# Patient Record
Sex: FEMALE | Race: OTHER | HISPANIC OR LATINO
[De-identification: names, ages, dates, MRNs, and addresses within clinical notes are randomized per-mention and may not be internally consistent; named-entity substitution may affect disease eponyms.]

---

## 2019-03-13 ENCOUNTER — APPOINTMENT (OUTPATIENT)
Dept: ANTEPARTUM | Facility: CLINIC | Age: 34
End: 2019-03-13

## 2019-03-28 ENCOUNTER — ASOB RESULT (OUTPATIENT)
Age: 34
End: 2019-03-28

## 2019-03-28 ENCOUNTER — APPOINTMENT (OUTPATIENT)
Dept: ANTEPARTUM | Facility: CLINIC | Age: 34
End: 2019-03-28
Payer: MEDICAID

## 2019-03-28 PROCEDURE — 76811 OB US DETAILED SNGL FETUS: CPT

## 2019-03-28 PROCEDURE — 76817 TRANSVAGINAL US OBSTETRIC: CPT

## 2019-05-21 ENCOUNTER — ASOB RESULT (OUTPATIENT)
Age: 34
End: 2019-05-21

## 2019-05-21 ENCOUNTER — APPOINTMENT (OUTPATIENT)
Dept: ANTEPARTUM | Facility: CLINIC | Age: 34
End: 2019-05-21
Payer: MEDICAID

## 2019-05-21 PROCEDURE — 76816 OB US FOLLOW-UP PER FETUS: CPT

## 2019-07-16 ENCOUNTER — APPOINTMENT (OUTPATIENT)
Dept: ANTEPARTUM | Facility: CLINIC | Age: 34
End: 2019-07-16
Payer: MEDICAID

## 2019-07-16 ENCOUNTER — ASOB RESULT (OUTPATIENT)
Age: 34
End: 2019-07-16

## 2019-07-16 PROCEDURE — 76819 FETAL BIOPHYS PROFIL W/O NST: CPT

## 2019-07-16 PROCEDURE — 76816 OB US FOLLOW-UP PER FETUS: CPT

## 2019-07-18 ENCOUNTER — OUTPATIENT (OUTPATIENT)
Dept: OUTPATIENT SERVICES | Facility: HOSPITAL | Age: 34
LOS: 1 days | End: 2019-07-18
Payer: COMMERCIAL

## 2019-07-18 DIAGNOSIS — Z01.818 ENCOUNTER FOR OTHER PREPROCEDURAL EXAMINATION: ICD-10-CM

## 2019-07-18 PROCEDURE — T1013: CPT

## 2019-07-25 ENCOUNTER — TRANSCRIPTION ENCOUNTER (OUTPATIENT)
Age: 34
End: 2019-07-25

## 2019-07-26 ENCOUNTER — INPATIENT (INPATIENT)
Facility: HOSPITAL | Age: 34
LOS: 2 days | Discharge: HOME | End: 2019-07-29
Attending: OBSTETRICS & GYNECOLOGY | Admitting: OBSTETRICS & GYNECOLOGY
Payer: COMMERCIAL

## 2019-07-26 ENCOUNTER — RESULT REVIEW (OUTPATIENT)
Age: 34
End: 2019-07-26

## 2019-07-26 ENCOUNTER — TRANSCRIPTION ENCOUNTER (OUTPATIENT)
Age: 34
End: 2019-07-26

## 2019-07-26 VITALS
SYSTOLIC BLOOD PRESSURE: 140 MMHG | TEMPERATURE: 98 F | DIASTOLIC BLOOD PRESSURE: 80 MMHG | HEART RATE: 76 BPM | RESPIRATION RATE: 16 BRPM

## 2019-07-26 DIAGNOSIS — O47.1 FALSE LABOR AT OR AFTER 37 COMPLETED WEEKS OF GESTATION: ICD-10-CM

## 2019-07-26 DIAGNOSIS — O34.219 MATERNAL CARE FOR UNSPECIFIED TYPE SCAR FROM PREVIOUS CESAREAN DELIVERY: ICD-10-CM

## 2019-07-26 DIAGNOSIS — O14.93 UNSPECIFIED PRE-ECLAMPSIA, THIRD TRIMESTER: ICD-10-CM

## 2019-07-26 LAB
ALBUMIN SERPL ELPH-MCNC: 3.7 G/DL — SIGNIFICANT CHANGE UP (ref 3.3–5.2)
ALP SERPL-CCNC: 194 U/L — HIGH (ref 40–120)
ALT FLD-CCNC: 8 U/L — SIGNIFICANT CHANGE UP
ANION GAP SERPL CALC-SCNC: 13 MMOL/L — SIGNIFICANT CHANGE UP (ref 5–17)
APPEARANCE UR: CLEAR — SIGNIFICANT CHANGE UP
APTT BLD: 28.1 SEC — SIGNIFICANT CHANGE UP (ref 27.5–36.3)
AST SERPL-CCNC: 23 U/L — SIGNIFICANT CHANGE UP
BACTERIA # UR AUTO: NEGATIVE — SIGNIFICANT CHANGE UP
BASOPHILS # BLD AUTO: 0.06 K/UL — SIGNIFICANT CHANGE UP (ref 0–0.2)
BASOPHILS NFR BLD AUTO: 0.7 % — SIGNIFICANT CHANGE UP (ref 0–2)
BILIRUB SERPL-MCNC: 0.3 MG/DL — LOW (ref 0.4–2)
BILIRUB UR-MCNC: NEGATIVE — SIGNIFICANT CHANGE UP
BLD GP AB SCN SERPL QL: SIGNIFICANT CHANGE UP
BUN SERPL-MCNC: 9 MG/DL — SIGNIFICANT CHANGE UP (ref 8–20)
CALCIUM SERPL-MCNC: 9.4 MG/DL — SIGNIFICANT CHANGE UP (ref 8.6–10.2)
CHLORIDE SERPL-SCNC: 108 MMOL/L — HIGH (ref 98–107)
CO2 SERPL-SCNC: 19 MMOL/L — LOW (ref 22–29)
COLOR SPEC: YELLOW — SIGNIFICANT CHANGE UP
CREAT ?TM UR-MCNC: 34 MG/DL — SIGNIFICANT CHANGE UP
CREAT SERPL-MCNC: 0.4 MG/DL — LOW (ref 0.5–1.3)
DIFF PNL FLD: ABNORMAL
EOSINOPHIL # BLD AUTO: 0.09 K/UL — SIGNIFICANT CHANGE UP (ref 0–0.5)
EOSINOPHIL NFR BLD AUTO: 1.1 % — SIGNIFICANT CHANGE UP (ref 0–6)
EPI CELLS # UR: SIGNIFICANT CHANGE UP
FIBRINOGEN PPP-MCNC: 662 MG/DL — HIGH (ref 350–510)
GLUCOSE SERPL-MCNC: 71 MG/DL — SIGNIFICANT CHANGE UP (ref 70–115)
GLUCOSE UR QL: NEGATIVE MG/DL — SIGNIFICANT CHANGE UP
HCT VFR BLD CALC: 43.5 % — SIGNIFICANT CHANGE UP (ref 34.5–45)
HGB BLD-MCNC: 14.3 G/DL — SIGNIFICANT CHANGE UP (ref 11.5–15.5)
IMM GRANULOCYTES NFR BLD AUTO: 0.5 % — SIGNIFICANT CHANGE UP (ref 0–1.5)
INR BLD: 0.88 RATIO — SIGNIFICANT CHANGE UP (ref 0.88–1.16)
KETONES UR-MCNC: NEGATIVE — SIGNIFICANT CHANGE UP
LDH SERPL L TO P-CCNC: 329 U/L — HIGH (ref 98–192)
LEUKOCYTE ESTERASE UR-ACNC: NEGATIVE — SIGNIFICANT CHANGE UP
LYMPHOCYTES # BLD AUTO: 1.75 K/UL — SIGNIFICANT CHANGE UP (ref 1–3.3)
LYMPHOCYTES # BLD AUTO: 21.6 % — SIGNIFICANT CHANGE UP (ref 13–44)
MCHC RBC-ENTMCNC: 30.4 PG — SIGNIFICANT CHANGE UP (ref 27–34)
MCHC RBC-ENTMCNC: 32.9 GM/DL — SIGNIFICANT CHANGE UP (ref 32–36)
MCV RBC AUTO: 92.4 FL — SIGNIFICANT CHANGE UP (ref 80–100)
MONOCYTES # BLD AUTO: 0.74 K/UL — SIGNIFICANT CHANGE UP (ref 0–0.9)
MONOCYTES NFR BLD AUTO: 9.1 % — SIGNIFICANT CHANGE UP (ref 2–14)
NEUTROPHILS # BLD AUTO: 5.43 K/UL — SIGNIFICANT CHANGE UP (ref 1.8–7.4)
NEUTROPHILS NFR BLD AUTO: 67 % — SIGNIFICANT CHANGE UP (ref 43–77)
NITRITE UR-MCNC: NEGATIVE — SIGNIFICANT CHANGE UP
PH UR: 7 — SIGNIFICANT CHANGE UP (ref 5–8)
PLATELET # BLD AUTO: 318 K/UL — SIGNIFICANT CHANGE UP (ref 150–400)
POTASSIUM SERPL-MCNC: 4.5 MMOL/L — SIGNIFICANT CHANGE UP (ref 3.5–5.3)
POTASSIUM SERPL-SCNC: 4.5 MMOL/L — SIGNIFICANT CHANGE UP (ref 3.5–5.3)
PROT ?TM UR-MCNC: 40 MG/DL — HIGH (ref 0–12)
PROT SERPL-MCNC: 7.4 G/DL — SIGNIFICANT CHANGE UP (ref 6.6–8.7)
PROT UR-MCNC: 30 MG/DL
PROT/CREAT UR-RTO: 1.2 RATIO — HIGH
PROTHROM AB SERPL-ACNC: 10.1 SEC — SIGNIFICANT CHANGE UP (ref 10–12.9)
RBC # BLD: 4.71 M/UL — SIGNIFICANT CHANGE UP (ref 3.8–5.2)
RBC # FLD: 13.2 % — SIGNIFICANT CHANGE UP (ref 10.3–14.5)
RBC CASTS # UR COMP ASSIST: SIGNIFICANT CHANGE UP /HPF (ref 0–4)
SODIUM SERPL-SCNC: 140 MMOL/L — SIGNIFICANT CHANGE UP (ref 135–145)
SP GR SPEC: 1 — LOW (ref 1.01–1.02)
URATE SERPL-MCNC: 6.1 MG/DL — HIGH (ref 2.4–5.7)
UROBILINOGEN FLD QL: NEGATIVE MG/DL — SIGNIFICANT CHANGE UP
WBC # BLD: 8.11 K/UL — SIGNIFICANT CHANGE UP (ref 3.8–10.5)
WBC # FLD AUTO: 8.11 K/UL — SIGNIFICANT CHANGE UP (ref 3.8–10.5)
WBC UR QL: SIGNIFICANT CHANGE UP

## 2019-07-26 PROCEDURE — 88302 TISSUE EXAM BY PATHOLOGIST: CPT | Mod: 26

## 2019-07-26 RX ORDER — LANOLIN
1 OINTMENT (GRAM) TOPICAL EVERY 6 HOURS
Refills: 0 | Status: DISCONTINUED | OUTPATIENT
Start: 2019-07-26 | End: 2019-07-29

## 2019-07-26 RX ORDER — DEXAMETHASONE 0.5 MG/5ML
4 ELIXIR ORAL EVERY 6 HOURS
Refills: 0 | Status: DISCONTINUED | OUTPATIENT
Start: 2019-07-26 | End: 2019-07-29

## 2019-07-26 RX ORDER — TETANUS TOXOID, REDUCED DIPHTHERIA TOXOID AND ACELLULAR PERTUSSIS VACCINE, ADSORBED 5; 2.5; 8; 8; 2.5 [IU]/.5ML; [IU]/.5ML; UG/.5ML; UG/.5ML; UG/.5ML
0.5 SUSPENSION INTRAMUSCULAR ONCE
Refills: 0 | Status: DISCONTINUED | OUTPATIENT
Start: 2019-07-26 | End: 2019-07-29

## 2019-07-26 RX ORDER — IBUPROFEN 200 MG
600 TABLET ORAL EVERY 6 HOURS
Refills: 0 | Status: COMPLETED | OUTPATIENT
Start: 2019-07-26 | End: 2020-06-23

## 2019-07-26 RX ORDER — SODIUM CHLORIDE 9 MG/ML
1000 INJECTION, SOLUTION INTRAVENOUS
Refills: 0 | Status: DISCONTINUED | OUTPATIENT
Start: 2019-07-26 | End: 2019-07-26

## 2019-07-26 RX ORDER — OXYTOCIN 10 UNIT/ML
333.33 VIAL (ML) INJECTION
Qty: 20 | Refills: 0 | Status: DISCONTINUED | OUTPATIENT
Start: 2019-07-26 | End: 2019-07-29

## 2019-07-26 RX ORDER — FAMOTIDINE 10 MG/ML
20 INJECTION INTRAVENOUS ONCE
Refills: 0 | Status: COMPLETED | OUTPATIENT
Start: 2019-07-26 | End: 2019-07-26

## 2019-07-26 RX ORDER — ACETAMINOPHEN 500 MG
975 TABLET ORAL
Refills: 0 | Status: DISCONTINUED | OUTPATIENT
Start: 2019-07-26 | End: 2019-07-29

## 2019-07-26 RX ORDER — DIPHENHYDRAMINE HCL 50 MG
25 CAPSULE ORAL EVERY 4 HOURS
Refills: 0 | Status: DISCONTINUED | OUTPATIENT
Start: 2019-07-26 | End: 2019-07-29

## 2019-07-26 RX ORDER — CEFAZOLIN SODIUM 1 G
2000 VIAL (EA) INJECTION ONCE
Refills: 0 | Status: COMPLETED | OUTPATIENT
Start: 2019-07-26 | End: 2019-07-26

## 2019-07-26 RX ORDER — MAGNESIUM HYDROXIDE 400 MG/1
30 TABLET, CHEWABLE ORAL
Refills: 0 | Status: DISCONTINUED | OUTPATIENT
Start: 2019-07-26 | End: 2019-07-29

## 2019-07-26 RX ORDER — HYDRALAZINE HCL 50 MG
5 TABLET ORAL ONCE
Refills: 0 | Status: DISCONTINUED | OUTPATIENT
Start: 2019-07-26 | End: 2019-07-26

## 2019-07-26 RX ORDER — ENOXAPARIN SODIUM 100 MG/ML
40 INJECTION SUBCUTANEOUS EVERY 24 HOURS
Refills: 0 | Status: DISCONTINUED | OUTPATIENT
Start: 2019-07-27 | End: 2019-07-29

## 2019-07-26 RX ORDER — BUTORPHANOL TARTRATE 2 MG/ML
0.25 INJECTION, SOLUTION INTRAMUSCULAR; INTRAVENOUS EVERY 6 HOURS
Refills: 0 | Status: DISCONTINUED | OUTPATIENT
Start: 2019-07-26 | End: 2019-07-26

## 2019-07-26 RX ORDER — KETOROLAC TROMETHAMINE 30 MG/ML
30 SYRINGE (ML) INJECTION EVERY 6 HOURS
Refills: 0 | Status: DISCONTINUED | OUTPATIENT
Start: 2019-07-26 | End: 2019-07-27

## 2019-07-26 RX ORDER — GLYCERIN ADULT
1 SUPPOSITORY, RECTAL RECTAL AT BEDTIME
Refills: 0 | Status: DISCONTINUED | OUTPATIENT
Start: 2019-07-26 | End: 2019-07-29

## 2019-07-26 RX ORDER — NALOXONE HYDROCHLORIDE 4 MG/.1ML
0.1 SPRAY NASAL
Refills: 0 | Status: DISCONTINUED | OUTPATIENT
Start: 2019-07-26 | End: 2019-07-29

## 2019-07-26 RX ORDER — HYDRALAZINE HCL 50 MG
5 TABLET ORAL ONCE
Refills: 0 | Status: COMPLETED | OUTPATIENT
Start: 2019-07-26 | End: 2019-07-26

## 2019-07-26 RX ORDER — SIMETHICONE 80 MG/1
80 TABLET, CHEWABLE ORAL EVERY 4 HOURS
Refills: 0 | Status: DISCONTINUED | OUTPATIENT
Start: 2019-07-26 | End: 2019-07-29

## 2019-07-26 RX ORDER — OXYCODONE HYDROCHLORIDE 5 MG/1
5 TABLET ORAL
Refills: 0 | Status: COMPLETED | OUTPATIENT
Start: 2019-07-26 | End: 2019-08-02

## 2019-07-26 RX ORDER — OXYCODONE HYDROCHLORIDE 5 MG/1
5 TABLET ORAL ONCE
Refills: 0 | Status: DISCONTINUED | OUTPATIENT
Start: 2019-07-26 | End: 2019-07-29

## 2019-07-26 RX ORDER — DOCUSATE SODIUM 100 MG
100 CAPSULE ORAL
Refills: 0 | Status: DISCONTINUED | OUTPATIENT
Start: 2019-07-26 | End: 2019-07-29

## 2019-07-26 RX ORDER — SODIUM CHLORIDE 9 MG/ML
1000 INJECTION, SOLUTION INTRAVENOUS
Refills: 0 | Status: DISCONTINUED | OUTPATIENT
Start: 2019-07-26 | End: 2019-07-29

## 2019-07-26 RX ORDER — ONDANSETRON 8 MG/1
4 TABLET, FILM COATED ORAL EVERY 6 HOURS
Refills: 0 | Status: DISCONTINUED | OUTPATIENT
Start: 2019-07-26 | End: 2019-07-29

## 2019-07-26 RX ORDER — CITRIC ACID/SODIUM CITRATE 300-500 MG
30 SOLUTION, ORAL ORAL ONCE
Refills: 0 | Status: COMPLETED | OUTPATIENT
Start: 2019-07-26 | End: 2019-07-26

## 2019-07-26 RX ORDER — DIPHENHYDRAMINE HCL 50 MG
25 CAPSULE ORAL EVERY 6 HOURS
Refills: 0 | Status: DISCONTINUED | OUTPATIENT
Start: 2019-07-26 | End: 2019-07-29

## 2019-07-26 RX ADMIN — Medication 30 MILLIGRAM(S): at 16:19

## 2019-07-26 RX ADMIN — SODIUM CHLORIDE 125 MILLILITER(S): 9 INJECTION, SOLUTION INTRAVENOUS at 21:45

## 2019-07-26 RX ADMIN — Medication 975 MILLIGRAM(S): at 22:13

## 2019-07-26 RX ADMIN — FAMOTIDINE 20 MILLIGRAM(S): 10 INJECTION INTRAVENOUS at 12:36

## 2019-07-26 RX ADMIN — Medication 30 MILLILITER(S): at 12:40

## 2019-07-26 RX ADMIN — SODIUM CHLORIDE 125 MILLILITER(S): 9 INJECTION, SOLUTION INTRAVENOUS at 12:26

## 2019-07-26 RX ADMIN — Medication 5 MILLIGRAM(S): at 13:05

## 2019-07-26 RX ADMIN — Medication 100 MILLIGRAM(S): at 13:21

## 2019-07-26 RX ADMIN — SODIUM CHLORIDE 125 MILLILITER(S): 9 INJECTION, SOLUTION INTRAVENOUS at 12:47

## 2019-07-26 RX ADMIN — Medication 1000 MILLIUNIT(S)/MIN: at 13:46

## 2019-07-26 RX ADMIN — Medication 975 MILLIGRAM(S): at 21:43

## 2019-07-26 NOTE — DISCHARGE NOTE OB - CARE PLAN
Principal Discharge DX:	 delivery delivered  Goal:	rapid recovery  Assessment and plan of treatment:	Please call your provider to schedule postoperative wound check visit in 2 weeks. Take medications as directed, regular diet, activity as tolerated. Exclusive breast feeding for the first 6 months is recommended. Nothing per vagina for 6 weeks (incl. sex, douching, etc). If you have additional concerns, please inform your provider.

## 2019-07-26 NOTE — DISCHARGE NOTE OB - HOSPITAL COURSE
Pt is 33yo  s/p  section. She was transferred to postpartum unit without complications during her stay. Upon discharge she is voiding, tolerating PO, ambulating, and pain is well controlled.

## 2019-07-26 NOTE — OB NEONATOLOGY/PEDIATRICIAN DELIVERY SUMMARY - NSPEDSNEONOTESA_OBGYN_ALL_OB_FT
requested by Dr Nayla Adamson to attend RC/S at 37 weeks for PE EDC 2019,mom is 32y/o ,O positive, GBS unknown,HIV negative,Hep b Negative,RI,RPRNR.C/S spinal anethesia.Under the warmer baby girl.breech dreid suctioned stimulated A/S

## 2019-07-26 NOTE — OB PROVIDER TRIAGE NOTE - NSOBPROVIDERNOTE_OBGYN_ALL_OB_FT
33YO  at 37wks by presents to L&D due to LOF since yesterday.    -Nitrazine and Amnisure negative   -Pt noted to have elevated BP. Continue to monitor BP. PEC labs sent     Attending Dr. Wnislow present 33YO  at 37wks by presents to L&D due to LOF since yesterday.    -Nitrazine and Amnisure negative   -Pt noted to have elevated BP. Continue to monitor BP. PEC labs sent     Attending Dr. Winslow present    addendum:  patient with boderline blood pressures with no symptoms  labs show that p/c ration is elevated from yesterday 0.4 now 1.2  uric acid is elevated  patient noncompliant and yet to complete 3 hour gtt and did not show up yesterday to evaluate bp's  will admit for repeat c/section at term for mild preeclampsia

## 2019-07-26 NOTE — DISCHARGE NOTE OB - MEDICATION SUMMARY - MEDICATIONS TO TAKE
I will START or STAY ON the medications listed below when I get home from the hospital:    ibuprofen 600 mg oral tablet  -- 1 tab(s) by mouth every 6 hours, As Needed -for mild pain - for moderate pain   -- Indication: For Mild to moderate pain

## 2019-07-26 NOTE — DISCHARGE NOTE OB - CARE PROVIDER_API CALL
Guillaume Ivory)  Obstetrics and Gynecology  [address] [place] [address]  Phone: [phone]  Fax: [phone]  Follow Up Time:

## 2019-07-26 NOTE — OB PROVIDER DELIVERY SUMMARY - NSPROVIDERDELIVERYNOTE_OBGYN_ALL_OB_FT
33 y/o  at 37w0d admitted for repeat  section 2/2 pre-eclampsia without severe features.   Patient was taken to OR, a Pfannenstiel skin incision was made and a classical  section was performed, a viable female infant was delivered atraumatically at 1346, placenta delivered manually and intact at 1347, APGARS 9/9, weight 3220g (7lbs 2oz).   Hysterotomy, fascia, and skin were reapproximated with suture.   Bilateral salpingectomy performed with LigaSure device, hemostasis obtained after each specimen. Both tubes sent to pathology.   Excellent hemostasis obtained at each layer of closure.   EBL: 700

## 2019-07-26 NOTE — OB PROVIDER TRIAGE NOTE - NSHPPHYSICALEXAM_GEN_ALL_CORE
Vital Signs Last 24 Hrs  T(C): 36.8 (26 Jul 2019 10:11), Max: 36.8 (26 Jul 2019 10:11)  T(F): 98.2 (26 Jul 2019 10:11), Max: 98.2 (26 Jul 2019 10:11)  HR: 75 (26 Jul 2019 10:32) (75 - 76)  BP: 140/80 (26 Jul 2019 10:11) (140/80 - 140/80)  RR: 16 (26 Jul 2019 10:11) (16 - 16)    Gen: NAD  GI: soft, gravid uterus, NT  Extrem: no edema or calf tenderness     FHT: 135 baseline, mod variability, + accelerations, no decels   [place]: irregular   Nitrazine and Amnisure negative

## 2019-07-26 NOTE — OB PROVIDER H&P - ASSESSMENT
35YO  at 37wks by presents to L&D due to LOF since yesterday. Elevated BP noted in triage. Pt now with preeclampsia w/o severe features.     -Admit to L&D for rCS  -Admission orders  -Consent  -GBS negative     -Attending Dr. Winslow present. 35YO  at 37wks by presents to L&D due to LOF since yesterday. Elevated BP noted in triage. Pt now with preeclampsia w/o severe features.     -Admit to L&D for rCS and BTL  -Admission orders  -Consent  -GBS negative     -Attending Dr. Winslow present.

## 2019-07-26 NOTE — OB PROVIDER H&P - HISTORY OF PRESENT ILLNESS
33YO  at 37wks by presents to L&D due to LOF since yesterday. Elevated BP noted in triage.   Denies VB, CTX. Reports + FM    Complications during pregnancy: Pt noted to have elevated BP in the office yesterday w/ elevated Protein/Cr     ObHx: CS (, ),  X1  PMH: None   PSH: None   Meds: PNV  Allergies: None

## 2019-07-26 NOTE — OB PROVIDER TRIAGE NOTE - HISTORY OF PRESENT ILLNESS
33YO  at 37wks by presents to L&D due to LOF since yesterday.  Denies VB, CTX. Reports + FM    Complications during pregnancy: Pt noted to have elevated BP in the office yesterday, Protein/Cr elevated at that visit     ObHx: CS (, ),  X1  PMH: None   PSH: None   Meds: PNV  Allergies: None

## 2019-07-26 NOTE — DISCHARGE NOTE OB - PATIENT PORTAL LINK FT
You can access the [facility], offered by [facility], by registering with the following website: [email or web address]

## 2019-07-26 NOTE — OB RN PATIENT PROFILE - PMH
Vaginal delivery  unsure of what year, baby dies at 2 days old <<----- Click to add NO pertinent Past Medical History

## 2019-07-26 NOTE — OB PROVIDER H&P - NSHPPHYSICALEXAM_GEN_ALL_CORE
Vital Signs Last 24 Hrs  T(C): 36.8 (26 Jul 2019 10:11), Max: 36.8 (26 Jul 2019 10:11)  T(F): 98.2 (26 Jul 2019 10:11), Max: 98.2 (26 Jul 2019 10:11)  HR: 67 (26 Jul 2019 11:53) (64 - 76)  BP: 118/75 (26 Jul 2019 11:53) (113/69 - 140/80)  RR: 16 (26 Jul 2019 10:11) (16 - 16)    Gen: NAD  GI: soft, gravid uterus, NT  Extrem: no edema or calf tenderness     FHT: 135 baseline, mod variability, + accelerations, no decels   [place]: no contractions Vital Signs Last 24 Hrs  T(C): 36.8 (26 Jul 2019 10:11), Max: 36.8 (26 Jul 2019 10:11)  T(F): 98.2 (26 Jul 2019 10:11), Max: 98.2 (26 Jul 2019 10:11)  HR: 67 (26 Jul 2019 11:53) (64 - 76)  BP: 118/75 (26 Jul 2019 11:53) (113/69 - 140/80)  RR: 16 (26 Jul 2019 10:11) (16 - 16)    Gen: NAD  GI: soft, gravid uterus, NT  Extrem: no edema or calf tenderness     FHT: 135 baseline, mod variability, + accelerations, no decels   [place]: no contractions  Bedside sono: transverse (back downwards)

## 2019-07-27 LAB
BASOPHILS # BLD AUTO: 0.05 K/UL — SIGNIFICANT CHANGE UP (ref 0–0.2)
BASOPHILS NFR BLD AUTO: 0.4 % — SIGNIFICANT CHANGE UP (ref 0–2)
BLD GP AB SCN SERPL QL: SIGNIFICANT CHANGE UP
EOSINOPHIL # BLD AUTO: 0.08 K/UL — SIGNIFICANT CHANGE UP (ref 0–0.5)
EOSINOPHIL NFR BLD AUTO: 0.7 % — SIGNIFICANT CHANGE UP (ref 0–6)
HCT VFR BLD CALC: 40.4 % — SIGNIFICANT CHANGE UP (ref 34.5–45)
HGB BLD-MCNC: 13.4 G/DL — SIGNIFICANT CHANGE UP (ref 11.5–15.5)
IMM GRANULOCYTES NFR BLD AUTO: 0.2 % — SIGNIFICANT CHANGE UP (ref 0–1.5)
LYMPHOCYTES # BLD AUTO: 1.16 K/UL — SIGNIFICANT CHANGE UP (ref 1–3.3)
LYMPHOCYTES # BLD AUTO: 9.6 % — LOW (ref 13–44)
MCHC RBC-ENTMCNC: 30.9 PG — SIGNIFICANT CHANGE UP (ref 27–34)
MCHC RBC-ENTMCNC: 33.2 GM/DL — SIGNIFICANT CHANGE UP (ref 32–36)
MCV RBC AUTO: 93.3 FL — SIGNIFICANT CHANGE UP (ref 80–100)
MEV IGG SER-ACNC: 158 AU/ML — SIGNIFICANT CHANGE UP
MEV IGG+IGM SER-IMP: POSITIVE — SIGNIFICANT CHANGE UP
MONOCYTES # BLD AUTO: 0.66 K/UL — SIGNIFICANT CHANGE UP (ref 0–0.9)
MONOCYTES NFR BLD AUTO: 5.5 % — SIGNIFICANT CHANGE UP (ref 2–14)
NEUTROPHILS # BLD AUTO: 10.12 K/UL — HIGH (ref 1.8–7.4)
NEUTROPHILS NFR BLD AUTO: 83.6 % — HIGH (ref 43–77)
PLATELET # BLD AUTO: 273 K/UL — SIGNIFICANT CHANGE UP (ref 150–400)
RBC # BLD: 4.33 M/UL — SIGNIFICANT CHANGE UP (ref 3.8–5.2)
RBC # FLD: 13.3 % — SIGNIFICANT CHANGE UP (ref 10.3–14.5)
T PALLIDUM AB TITR SER: NEGATIVE — SIGNIFICANT CHANGE UP
WBC # BLD: 12.1 K/UL — HIGH (ref 3.8–10.5)
WBC # FLD AUTO: 12.1 K/UL — HIGH (ref 3.8–10.5)

## 2019-07-27 RX ORDER — OXYCODONE HYDROCHLORIDE 5 MG/1
5 TABLET ORAL
Refills: 0 | Status: DISCONTINUED | OUTPATIENT
Start: 2019-07-27 | End: 2019-07-29

## 2019-07-27 RX ORDER — IBUPROFEN 200 MG
600 TABLET ORAL EVERY 6 HOURS
Refills: 0 | Status: DISCONTINUED | OUTPATIENT
Start: 2019-07-27 | End: 2019-07-29

## 2019-07-27 RX ADMIN — Medication 30 MILLIGRAM(S): at 12:38

## 2019-07-27 RX ADMIN — Medication 975 MILLIGRAM(S): at 22:50

## 2019-07-27 RX ADMIN — Medication 30 MILLIGRAM(S): at 05:35

## 2019-07-27 RX ADMIN — Medication 30 MILLIGRAM(S): at 12:23

## 2019-07-27 RX ADMIN — OXYCODONE HYDROCHLORIDE 5 MILLIGRAM(S): 5 TABLET ORAL at 15:49

## 2019-07-27 RX ADMIN — Medication 975 MILLIGRAM(S): at 16:45

## 2019-07-27 RX ADMIN — Medication 975 MILLIGRAM(S): at 21:35

## 2019-07-27 RX ADMIN — Medication 600 MILLIGRAM(S): at 18:19

## 2019-07-27 RX ADMIN — Medication 975 MILLIGRAM(S): at 15:50

## 2019-07-27 RX ADMIN — Medication 600 MILLIGRAM(S): at 18:40

## 2019-07-27 RX ADMIN — ENOXAPARIN SODIUM 40 MILLIGRAM(S): 100 INJECTION SUBCUTANEOUS at 12:23

## 2019-07-27 RX ADMIN — Medication 30 MILLIGRAM(S): at 06:05

## 2019-07-27 RX ADMIN — OXYCODONE HYDROCHLORIDE 5 MILLIGRAM(S): 5 TABLET ORAL at 16:45

## 2019-07-27 NOTE — PROGRESS NOTE ADULT - ASSESSMENT
BOGDAN  GRISELDA Patel is a 34y year old  who is post-op day #1 who delivered a female infant at 37w Gestational Age by  delivery and bilateral salpingectomy. No acute events.     PLAN  1. Routine post-op care  2. Continue to encourage ambulation, PO intake and breastfeeding  3. DVT prophylaxis: lovenox qdaily  4. Continue pain management PRN.   5. Remove dressing at 24hr post-op  6. Plan to discharge post-op day 3  7. trial of void once hadley catheter is removed

## 2019-07-27 NOTE — PROGRESS NOTE ADULT - SUBJECTIVE AND OBJECTIVE BOX
Delivery Progress Note    GRISELDA Patel is a 34y year old  who is post-op day #1 who delivered a female infant at 37w Gestational Age by  delivery and bilateral salpingectomy.    SUBJECTIVE:  No acute events overnight. Pain is well controlled with PRN pain medication. No problems with PO intake. Has not had flatus and no BM. Denies nausea and vomiting. Patient is having appropriate lochia which is decreasing.    She is breastfeeding and the baby is latching on. She is not yet voiding or ambulating.       OBJECTIVE:  Physical exam:  Vital Signs Last 24 Hrs  T(C): 36.5 (2019 04:15), Max: 37.1 (2019 00:10)  T(F): 97.7 (2019 04:15), Max: 98.8 (2019 00:10)  HR: 80 (2019 04:15) (46 - 109)  BP: 121/79 (2019 04:15) (112/75 - 184/76)  RR: 18 (2019 04:15) (13 - 118)  SpO2: 99% (2019 04:15) (95% - 99%)  General: alert and oriented x3, no acute distress.  breast: soft, no tenderness to palpation.  Abdomen: Soft, appropriately tender to palpitation, firm uterine fundus at umbilicus, bowel sounds present in all four quadrants. Incision appears dry and intact.  : patient was having hadley catheter removed.   Extremities: no tenderness, redness or swelling in lower extremities bilaterally.     Labs:                         14.3   8.11  )-----------( 318      ( 2019 11:01 )             43.5     PT/INR - ( 2019 11:01 )   PT: 10.1 sec;   INR: 0.88 ratio         PTT - ( 2019 11:01 )  PTT:28.1 sec    ASSESMENT  GRISELDA Patel is a 34y year old  who is post-op day #1 who delivered a female infant at 37w Gestational Age by  delivery and bilateral salpingectomy. No acute events.     PLAN  1. Routine post-op care  2. Continue to encourage ambulation, PO intake and breastfeeding  3. DVT prophylaxis: lovenox/heparin/SCDs  4. Continue pain management PRN.   5. Remove dressing at 24hr post-op  6. Plan to discharge post-op day 3

## 2019-07-28 RX ADMIN — Medication 600 MILLIGRAM(S): at 00:19

## 2019-07-28 RX ADMIN — Medication 975 MILLIGRAM(S): at 21:01

## 2019-07-28 RX ADMIN — Medication 600 MILLIGRAM(S): at 06:18

## 2019-07-28 RX ADMIN — Medication 975 MILLIGRAM(S): at 15:07

## 2019-07-28 RX ADMIN — Medication 600 MILLIGRAM(S): at 05:48

## 2019-07-28 RX ADMIN — Medication 600 MILLIGRAM(S): at 18:43

## 2019-07-28 RX ADMIN — Medication 600 MILLIGRAM(S): at 00:49

## 2019-07-28 RX ADMIN — ENOXAPARIN SODIUM 40 MILLIGRAM(S): 100 INJECTION SUBCUTANEOUS at 15:09

## 2019-07-28 RX ADMIN — Medication 975 MILLIGRAM(S): at 21:52

## 2019-07-28 RX ADMIN — Medication 600 MILLIGRAM(S): at 13:00

## 2019-07-28 RX ADMIN — Medication 975 MILLIGRAM(S): at 16:00

## 2019-07-28 RX ADMIN — Medication 600 MILLIGRAM(S): at 18:03

## 2019-07-28 RX ADMIN — Medication 600 MILLIGRAM(S): at 12:07

## 2019-07-28 NOTE — PROGRESS NOTE ADULT - SUBJECTIVE AND OBJECTIVE BOX
Delivery Progress Note    GRISELDA Patel is a 34y year old  who is post-op day #2 who delivered a female infant at 37 w Gestational Age by  delivery and BTL.     SUBJECTIVE:  No acute events overnight. Pain is well controlled with PRN pain medication. No problems with ambulating, voiding, or PO intake. Has had flatus but no BM. Denies nausea and vomiting. Patient is having appropriate lochia which is decreasing.    She is breastfeeding and the baby is latching on.     OBJECTIVE:  Physical exam:  Vital Signs Last 24 Hrs  T(C): 37.2 (2019 22:25), Max: 37.2 (2019 22:25)  T(F): 99 (2019 22:25), Max: 99 (2019 22:25)  HR: 90 (2019 22:25) (73 - 90)  BP: 115/73 (2019 22:25) (115/73 - 126/74)  RR: 18 (2019 22:25) (18 - 18)  SpO2: 94% (2019 22:25) (94% - 94%)  General: alert and oriented x3, no acute distress.  Heart: regular rate and rhythmn, no murmurs, rubs or gallops appreciated.  Lungs: clear to auscultation bilaterally.   breast: soft, no tenderness to palpation.  Abdomen: Soft, appropriately tender to palpitation, firm uterine fundus at umbilicus, bowel sounds present in all four quadrants. Incision appears dry and intact.  Extremities: no tenderness, redness or swelling in lower extremities bilaterally.     Labs:                         13.4   12.10 )-----------( 273      ( 2019 07:39 )             40.4     PT/INR - ( 2019 11:01 )   PT: 10.1 sec;   INR: 0.88 ratio         PTT - ( 2019 11:01 )  PTT:28.1 sec

## 2019-07-28 NOTE — PROGRESS NOTE ADULT - ASSESSMENT
ASSESSMENT  GRISELDA Patel is a 34y year old  who is post-op day #2 who delivered a female infant at 37 w Gestational Age by  delivery and BTL. No acute events.     PLAN  1. Routine post-op care  2. Continue to encourage ambulation, PO intake and breastfeeding  3. DVT prophylaxis: lovenox 40mg qdaily  4. Continue pain management PRN.   5. Remove dressing at 24hr post-op  6. Plan to discharge post-op day 3

## 2019-07-29 VITALS
SYSTOLIC BLOOD PRESSURE: 132 MMHG | TEMPERATURE: 99 F | HEART RATE: 74 BPM | DIASTOLIC BLOOD PRESSURE: 84 MMHG | RESPIRATION RATE: 18 BRPM

## 2019-07-29 PROCEDURE — 88302 TISSUE EXAM BY PATHOLOGIST: CPT

## 2019-07-29 PROCEDURE — 81001 URINALYSIS AUTO W/SCOPE: CPT

## 2019-07-29 PROCEDURE — T1013: CPT

## 2019-07-29 PROCEDURE — G0463: CPT

## 2019-07-29 PROCEDURE — 86900 BLOOD TYPING SEROLOGIC ABO: CPT

## 2019-07-29 PROCEDURE — 85384 FIBRINOGEN ACTIVITY: CPT

## 2019-07-29 PROCEDURE — 82570 ASSAY OF URINE CREATININE: CPT

## 2019-07-29 PROCEDURE — 59025 FETAL NON-STRESS TEST: CPT

## 2019-07-29 PROCEDURE — 86780 TREPONEMA PALLIDUM: CPT

## 2019-07-29 PROCEDURE — 84156 ASSAY OF PROTEIN URINE: CPT

## 2019-07-29 PROCEDURE — 85610 PROTHROMBIN TIME: CPT

## 2019-07-29 PROCEDURE — 36415 COLL VENOUS BLD VENIPUNCTURE: CPT

## 2019-07-29 PROCEDURE — 85027 COMPLETE CBC AUTOMATED: CPT

## 2019-07-29 PROCEDURE — C1765: CPT

## 2019-07-29 PROCEDURE — 80053 COMPREHEN METABOLIC PANEL: CPT

## 2019-07-29 PROCEDURE — 84550 ASSAY OF BLOOD/URIC ACID: CPT

## 2019-07-29 PROCEDURE — 86850 RBC ANTIBODY SCREEN: CPT

## 2019-07-29 PROCEDURE — 86765 RUBEOLA ANTIBODY: CPT

## 2019-07-29 PROCEDURE — 83615 LACTATE (LD) (LDH) ENZYME: CPT

## 2019-07-29 PROCEDURE — C1889: CPT

## 2019-07-29 PROCEDURE — 86901 BLOOD TYPING SEROLOGIC RH(D): CPT

## 2019-07-29 PROCEDURE — 85730 THROMBOPLASTIN TIME PARTIAL: CPT

## 2019-07-29 RX ORDER — IBUPROFEN 200 MG
1 TABLET ORAL
Qty: 28 | Refills: 0
Start: 2019-07-29 | End: 2019-08-04

## 2019-07-29 RX ADMIN — Medication 600 MILLIGRAM(S): at 00:13

## 2019-07-29 RX ADMIN — Medication 600 MILLIGRAM(S): at 01:00

## 2019-07-29 RX ADMIN — Medication 975 MILLIGRAM(S): at 10:15

## 2019-07-29 RX ADMIN — Medication 975 MILLIGRAM(S): at 09:24

## 2019-07-29 RX ADMIN — Medication 600 MILLIGRAM(S): at 06:36

## 2019-07-29 NOTE — PROGRESS NOTE ADULT - SUBJECTIVE AND OBJECTIVE BOX
Subjective:  The patient feels well.  She is ambulating without difficulty.  She is tolerating PO.  She is voiding.  She denies nausea and vomiting.  Her pain is controlled.  She reports normal postpartum bleeding      Physical exam:    Vital Signs Last 24 Hrs  T(C): 36.7 (28 Jul 2019 20:03), Max: 36.9 (28 Jul 2019 09:35)  T(F): 98.1 (28 Jul 2019 20:03), Max: 98.4 (28 Jul 2019 09:35)  HR: 61 (28 Jul 2019 20:03) (61 - 70)  BP: 114/63 (28 Jul 2019 20:03) (114/63 - 128/83)  BP(mean): --  RR: 18 (28 Jul 2019 20:03) (18 - 18)  SpO2: 95% (28 Jul 2019 20:03) (95% - 95%)    Gen: NAD  Breast :  Abdomen: Soft, nontender, no distension , firm uterine fundus at umbilicus.  Incision: Clean, dry, and intact with steri strips  Pelvic: Normal lochia noted  Ext: No calf tenderness    LABS:                        13.4   12.10 )-----------( 273      ( 27 Jul 2019 07:39 )             40.4     blood type    A/P POD # 3 s/p    Doing well.  Encourage ambulation.  Discharge home today.  Prescriptions for percocet and motrin electronically sent to the pharmacy.  F/U in 2 weeks for incision check.  Call for fevers, chills, nausea, vomiting, heavy vaginal bleeding, vaginal discharge, severe pain, symptoms of depression, problems with incision or any other concerning symptoms.  Nothing in vagina and no heavy lifting x 6 weeks.  No driving x 2 weeks.  Questions answered.

## 2019-07-29 NOTE — PROGRESS NOTE ADULT - ASSESSMENT
ASSESSMENT  GRISELDA Patel is a 34y year old  who is post-op day #3 who delivered a female infant at 37w Gestational Age by  delivery and bilateral salpingectomy. No acute events.    PLAN  1. Routine post-op care  2. Continue to encourage ambulation, PO intake and breastfeeding  3. DVT prophylaxis: lovenox 40mg qdaily  4. Continue pain management PRN.   5. Remove dressing at 24hr post-op  6. Plan to discharge post-op day 3

## 2019-07-29 NOTE — PROGRESS NOTE ADULT - SUBJECTIVE AND OBJECTIVE BOX
Delivery Progress Note    GRISELDA Patel is a 34y year old  who is post-op day #3 who delivered a female infant at 37w Gestational Age by  delivery and bilateral salpingectomy     SUBJECTIVE:  No acute events overnight. Pain is well controlled with PRN pain medication. No problems with ambulating, voiding, or PO intake. Has had flatus but no BM. Denies nausea and vomiting. Patient is having appropriate lochia which is decreasing.    She is breastfeeding and the baby is latching on. Patient stated she felt a "bump" on her anus while in the bathroom that is itchy but otherwise not bothersome.      OBJECTIVE:  Physical exam:  Vital Signs Last 24 Hrs  T(C): 36.7 (2019 20:03), Max: 36.9 (2019 09:35)  T(F): 98.1 (2019 20:03), Max: 98.4 (2019 09:35)  HR: 61 (2019 20:03) (61 - 70)  BP: 114/63 (2019 20:03) (114/63 - 128/83)  RR: 18 (2019 20:03) (18 - 18)  SpO2: 95% (2019 20:03) (95% - 95%)  General: alert and oriented x3, no acute distress.  Heart: regular rate and rhythmn, no murmurs, rubs or gallops appreciated.  Lungs: clear to auscultation bilaterally.   breast: soft, no tenderness to palpation.  Abdomen: Soft, appropriately tender to palpitation, firm uterine fundus at umbilicus, bowel sounds present in all four quadrants. Incision appears dry and intact.  Extremities: no tenderness, redness or swelling in lower extremities bilaterally.   : examined patient's anus, no redness, swelling, or tenderness to palpation was appreciated. No rashes present.     Labs:                         13.4   12.10 )-----------( 273      ( 2019 07:39 )             40.4

## 2019-07-31 LAB — SURGICAL PATHOLOGY STUDY: SIGNIFICANT CHANGE UP

## 2020-04-21 NOTE — OB RN PATIENT PROFILE - NS TRANSFER PATIENT BELONGINGS
Authorization obtained for Echocardiogram ( CPT 95534 )  Of [id number] valid 04/21/2020 to 10/17/2020. Jammie in Cardiology notified.  
Clothing

## 2023-07-11 NOTE — OB PROVIDER H&P - TERM DELIVERIES, OB PROFILE
2
Gen: No fever, normal appetite  Eyes: no eye irritation/ pain   ENT: + right ear pain, congestion, sore throat  Resp: No cough or trouble breathing  Cardiovascular: No chest pain or palpitation  Gastroenteric: No nausea/vomiting, or abd pain   :  No change in urine output; no dysuria  MS: No joint or muscle pain  Skin: No rashes  Neuro: see HPI   Remainder negative, except as per the HPI
